# Patient Record
Sex: MALE | Race: WHITE | Employment: FULL TIME | URBAN - METROPOLITAN AREA
[De-identification: names, ages, dates, MRNs, and addresses within clinical notes are randomized per-mention and may not be internally consistent; named-entity substitution may affect disease eponyms.]

---

## 2018-10-19 RX ORDER — CEFAZOLIN SODIUM/WATER 2 G/20 ML
2 SYRINGE (ML) INTRAVENOUS ONCE
Status: CANCELLED | OUTPATIENT
Start: 2018-10-19 | End: 2018-10-19

## 2018-10-19 RX ORDER — SODIUM CHLORIDE 0.9 % (FLUSH) 0.9 %
5-10 SYRINGE (ML) INJECTION EVERY 8 HOURS
Status: CANCELLED | OUTPATIENT
Start: 2018-10-19

## 2018-10-19 RX ORDER — SODIUM CHLORIDE 0.9 % (FLUSH) 0.9 %
5-10 SYRINGE (ML) INJECTION AS NEEDED
Status: CANCELLED | OUTPATIENT
Start: 2018-10-19

## 2018-11-04 PROBLEM — M77.11 LATERAL EPICONDYLITIS OF RIGHT ELBOW: Status: ACTIVE | Noted: 2018-11-04

## 2018-11-05 ENCOUNTER — HOSPITAL ENCOUNTER (OUTPATIENT)
Dept: SURGERY | Age: 43
Discharge: HOME | End: 2018-11-05

## 2018-11-11 NOTE — H&P (VIEW-ONLY)
aDate: 2018 Name: Connie Choe MRN: [id number] : 1975 Age: 37 y.o. Sex: male Mono Galan MD  
 
 
CC:  No chief complaint on file. HPI: 
 
 Connie Choe is a 37 y.o. male who presents for evaluation of a soft tissue mass as a referral from Dr. Niranjan Yoon. The patient had a soft tissue mass excised at a dermatologists office, but the wound has dehisced. The lesion on the right upper back is open and draining. It will need reexcision. PMH: 
 
Past Medical History:  
Diagnosis Date  Acute bursitis of right shoulder  Adverse effect of anesthesia   
 hx after wisdom teeth extraction surgery  Androgen deficiency   
 managed with medication  Bigeminy 3/20/14  
 pt reports arrhythmia as result rxn to levaquin; (stress echo): \"baseline EKG shows sinus rhythm with PVCs. \"  
 Heartburn   
 occasionally. diet controlled  High cholesterol  Hx of echocardiogram 3/20/2014  
 exercise stress ECHO- \"non-diagnostic EKG changes with no symptoms of myocardial ischemia. The pt had excellent exercise tolerance. \"  
 Hypertension   
 controlled w/med  Migraine   
 hx. meds prn  Primary osteoarthritis of right elbow  Psychiatric disorder   
 anxiety  Thyroid disease   
 hypothryoid. stable w/med PSH: 
 
Past Surgical History:  
Procedure Laterality Date  HX SHOULDER ARTHROSCOPY Right 2015  HX SHOULDER ARTHROSCOPY Left 2015  HX VASECTOMY [facility] MEDS: 
 
Current Outpatient Medications Medication Sig  levothyroxine (SYNTHROID) 175 mcg tablet Take 175 mcg by mouth Daily (before breakfast). Take / use AM day of surgery  per anesthesia protocols.  fenofibrate nanocrystallized (TRICOR) 145 mg tablet Take  by mouth daily.  lisinopril (PRINIVIL, ZESTRIL) 10 mg tablet Take  by mouth daily.  busPIRone (BUSPAR) 7.5 mg tablet Take 7.5 mg by mouth daily.  Take / use AM day of surgery  per anesthesia protocols.  testosterone (ANDROGEL) 1.25 gram/ actuation (1 %) glpm 5 Pump(s) by TransDERmal route daily.  ergocalciferol (VITAMIN D2) 50,000 unit capsule Take 50,000 Units by mouth every seven (7) days.  fluticasone (FLONASE) 50 mcg/actuation nasal spray as needed.  SUMAtriptan (IMITREX) 50 mg tablet Take 50 mg by mouth once as needed for Migraine. Take / use AM day of surgery if needed per anesthesia protocols.  azelastine (ASTELIN) 137 mcg (0.1 %) nasal spray 1 Spray as needed for Rhinitis. Use in each nostril as directed No current facility-administered medications for this visit. ALLERGIES:   
 
Allergies Allergen Reactions  Levaquin [Levofloxacin] Anaphylaxis  Sulfa (Sulfonamide Antibiotics) Anaphylaxis SH: Social History Tobacco Use  Smoking status: Never Smoker  Smokeless tobacco: Never Used Substance Use Topics  Alcohol use: Yes Comment: on occasion  Drug use: No  
 
 
FH: 
 
Family History Problem Relation Age of Onset  Hypertension Mother  Diabetes Father ROS: The patient has no difficulty with chest pain or shortness of breath. No fever or chills. Comprehensive 13 point review of systems was otherwise unremarkable except as noted above. Physical Exam:  
 
There were no vitals taken for this visit. General: Alert, oriented, cooperative white male in no acute distress. Eyes: Sclera are clear. Conjunctiva and lids within normal limits. No icterus. Ears and Nose: no gross deformities to visual inspection, gross hearing intact Neck: Supple, trachea midline. No lymphadenopathy. Resp: Breathing is  non-labored. Lungs clear to auscultation without wheezing or rhonchi CV: RRR. No murmurs, rubs or gallops appreciated. Abd: soft non-tender and non-distended without peritoneal signs. +bs Back: right upper back previous surgical site open with fibrinous exudate and serous drainage present. Psych:  Mood and affect appropriate. Short-term memory and understanding intact Assessment/Plan:  Nory Navarro is a 37 y.o. male who has signs and symptoms consistent with an open right upper back wound which requires reexcision. 1.  Excision of a right upper back soft tissue mass in the operating room. 2.  I went through the risks of bleeding, infection, anesthesia, hematoma/seroma formation and possible recurrence of the lesion. I said it may be necessary to place a drain. It may be necessary to leave the wound open, if badly infected.  
 
Brandee Mendez MD 
  
 FACS   11/11/2018  7:29 AM

## 2018-11-15 ENCOUNTER — ANESTHESIA EVENT (OUTPATIENT)
Dept: SURGERY | Age: 43
End: 2018-11-15
Payer: COMMERCIAL

## 2018-11-16 ENCOUNTER — ANESTHESIA (OUTPATIENT)
Dept: SURGERY | Age: 43
End: 2018-11-16
Payer: COMMERCIAL

## 2018-11-16 ENCOUNTER — HOSPITAL ENCOUNTER (OUTPATIENT)
Age: 43
Setting detail: OUTPATIENT SURGERY
Discharge: HOME | End: 2018-11-16
Attending: SURGERY | Admitting: SURGERY
Payer: COMMERCIAL

## 2018-11-16 VITALS
WEIGHT: 191.8 LBS | OXYGEN SATURATION: 97 % | HEART RATE: 50 BPM | TEMPERATURE: 97.5 F | DIASTOLIC BLOOD PRESSURE: 78 MMHG | HEIGHT: 68 IN | RESPIRATION RATE: 16 BRPM | SYSTOLIC BLOOD PRESSURE: 131 MMHG | BODY MASS INDEX: 29.07 KG/M2

## 2018-11-16 PROCEDURE — 74011250636 HC RX REV CODE- 250/636

## 2018-11-16 PROCEDURE — 76210000006 HC OR PH I REC 0.5 TO 1 HR: Performed by: SURGERY

## 2018-11-16 PROCEDURE — 76210000020 HC REC RM PH II FIRST 0.5 HR: Performed by: SURGERY

## 2018-11-16 PROCEDURE — 77030018836 HC SOL IRR NACL ICUM -A: Performed by: SURGERY

## 2018-11-16 PROCEDURE — 76010000160 HC OR TIME 0.5 TO 1 HR INTENSV-TIER 1: Performed by: SURGERY

## 2018-11-16 PROCEDURE — 77030039425 HC BLD LARYNG TRULITE DISP TELE -A: Performed by: ANESTHESIOLOGY

## 2018-11-16 PROCEDURE — 74011250636 HC RX REV CODE- 250/636: Performed by: SURGERY

## 2018-11-16 PROCEDURE — 76060000032 HC ANESTHESIA 0.5 TO 1 HR: Performed by: SURGERY

## 2018-11-16 PROCEDURE — 77030032490 HC SLV COMPR SCD KNE COVD -B: Performed by: SURGERY

## 2018-11-16 PROCEDURE — 77030020782 HC GWN BAIR PAWS FLX 3M -B: Performed by: ANESTHESIOLOGY

## 2018-11-16 PROCEDURE — 74011250636 HC RX REV CODE- 250/636: Performed by: ANESTHESIOLOGY

## 2018-11-16 PROCEDURE — 74011000250 HC RX REV CODE- 250: Performed by: SURGERY

## 2018-11-16 PROCEDURE — 74011000250 HC RX REV CODE- 250

## 2018-11-16 PROCEDURE — 77030031139 HC SUT VCRL2 J&J -A: Performed by: SURGERY

## 2018-11-16 PROCEDURE — 88305 TISSUE EXAM BY PATHOLOGIST: CPT

## 2018-11-16 PROCEDURE — 77030037088 HC TUBE ENDOTRACH ORAL NSL COVD-A: Performed by: ANESTHESIOLOGY

## 2018-11-16 RX ORDER — CEFAZOLIN SODIUM/WATER 2 G/20 ML
2 SYRINGE (ML) INTRAVENOUS
Status: COMPLETED | OUTPATIENT
Start: 2018-11-16 | End: 2018-11-16

## 2018-11-16 RX ORDER — ROCURONIUM BROMIDE 10 MG/ML
INJECTION, SOLUTION INTRAVENOUS AS NEEDED
Status: DISCONTINUED | OUTPATIENT
Start: 2018-11-16 | End: 2018-11-16 | Stop reason: HOSPADM

## 2018-11-16 RX ORDER — SODIUM CHLORIDE 0.9 % (FLUSH) 0.9 %
5-10 SYRINGE (ML) INJECTION EVERY 8 HOURS
Status: DISCONTINUED | OUTPATIENT
Start: 2018-11-16 | End: 2018-11-16 | Stop reason: HOSPADM

## 2018-11-16 RX ORDER — SODIUM CHLORIDE 0.9 % (FLUSH) 0.9 %
5-10 SYRINGE (ML) INJECTION AS NEEDED
Status: DISCONTINUED | OUTPATIENT
Start: 2018-11-16 | End: 2018-11-16 | Stop reason: HOSPADM

## 2018-11-16 RX ORDER — DEXAMETHASONE SODIUM PHOSPHATE 4 MG/ML
INJECTION, SOLUTION INTRA-ARTICULAR; INTRALESIONAL; INTRAMUSCULAR; INTRAVENOUS; SOFT TISSUE AS NEEDED
Status: DISCONTINUED | OUTPATIENT
Start: 2018-11-16 | End: 2018-11-16 | Stop reason: HOSPADM

## 2018-11-16 RX ORDER — SUCCINYLCHOLINE CHLORIDE 20 MG/ML
INJECTION INTRAMUSCULAR; INTRAVENOUS AS NEEDED
Status: DISCONTINUED | OUTPATIENT
Start: 2018-11-16 | End: 2018-11-16 | Stop reason: HOSPADM

## 2018-11-16 RX ORDER — FLUMAZENIL 0.1 MG/ML
0.2 INJECTION INTRAVENOUS
Status: DISCONTINUED | OUTPATIENT
Start: 2018-11-16 | End: 2018-11-16 | Stop reason: HOSPADM

## 2018-11-16 RX ORDER — GLYCOPYRROLATE 0.2 MG/ML
INJECTION INTRAMUSCULAR; INTRAVENOUS AS NEEDED
Status: DISCONTINUED | OUTPATIENT
Start: 2018-11-16 | End: 2018-11-16 | Stop reason: HOSPADM

## 2018-11-16 RX ORDER — OXYCODONE HYDROCHLORIDE 5 MG/1
5 TABLET ORAL
Status: DISCONTINUED | OUTPATIENT
Start: 2018-11-16 | End: 2018-11-16 | Stop reason: HOSPADM

## 2018-11-16 RX ORDER — ONDANSETRON 2 MG/ML
INJECTION INTRAMUSCULAR; INTRAVENOUS AS NEEDED
Status: DISCONTINUED | OUTPATIENT
Start: 2018-11-16 | End: 2018-11-16 | Stop reason: HOSPADM

## 2018-11-16 RX ORDER — SODIUM CHLORIDE, SODIUM LACTATE, POTASSIUM CHLORIDE, CALCIUM CHLORIDE 600; 310; 30; 20 MG/100ML; MG/100ML; MG/100ML; MG/100ML
75 INJECTION, SOLUTION INTRAVENOUS CONTINUOUS
Status: DISCONTINUED | OUTPATIENT
Start: 2018-11-16 | End: 2018-11-16 | Stop reason: HOSPADM

## 2018-11-16 RX ORDER — MIDAZOLAM HYDROCHLORIDE 1 MG/ML
2 INJECTION, SOLUTION INTRAMUSCULAR; INTRAVENOUS
Status: DISCONTINUED | OUTPATIENT
Start: 2018-11-16 | End: 2018-11-16 | Stop reason: HOSPADM

## 2018-11-16 RX ORDER — KETOROLAC TROMETHAMINE 30 MG/ML
INJECTION, SOLUTION INTRAMUSCULAR; INTRAVENOUS AS NEEDED
Status: DISCONTINUED | OUTPATIENT
Start: 2018-11-16 | End: 2018-11-16 | Stop reason: HOSPADM

## 2018-11-16 RX ORDER — FENTANYL CITRATE 50 UG/ML
INJECTION, SOLUTION INTRAMUSCULAR; INTRAVENOUS AS NEEDED
Status: DISCONTINUED | OUTPATIENT
Start: 2018-11-16 | End: 2018-11-16 | Stop reason: HOSPADM

## 2018-11-16 RX ORDER — NEOSTIGMINE METHYLSULFATE 1 MG/ML
INJECTION, SOLUTION INTRAVENOUS AS NEEDED
Status: DISCONTINUED | OUTPATIENT
Start: 2018-11-16 | End: 2018-11-16 | Stop reason: HOSPADM

## 2018-11-16 RX ORDER — BUPIVACAINE HYDROCHLORIDE 5 MG/ML
INJECTION, SOLUTION EPIDURAL; INTRACAUDAL AS NEEDED
Status: DISCONTINUED | OUTPATIENT
Start: 2018-11-16 | End: 2018-11-16 | Stop reason: HOSPADM

## 2018-11-16 RX ORDER — HYDROMORPHONE HYDROCHLORIDE 2 MG/ML
0.5 INJECTION, SOLUTION INTRAMUSCULAR; INTRAVENOUS; SUBCUTANEOUS
Status: DISCONTINUED | OUTPATIENT
Start: 2018-11-16 | End: 2018-11-16 | Stop reason: HOSPADM

## 2018-11-16 RX ORDER — LIDOCAINE HYDROCHLORIDE 10 MG/ML
0.1 INJECTION INFILTRATION; PERINEURAL AS NEEDED
Status: DISCONTINUED | OUTPATIENT
Start: 2018-11-16 | End: 2018-11-16 | Stop reason: HOSPADM

## 2018-11-16 RX ORDER — LIDOCAINE HYDROCHLORIDE 20 MG/ML
INJECTION, SOLUTION EPIDURAL; INFILTRATION; INTRACAUDAL; PERINEURAL AS NEEDED
Status: DISCONTINUED | OUTPATIENT
Start: 2018-11-16 | End: 2018-11-16 | Stop reason: HOSPADM

## 2018-11-16 RX ORDER — NALOXONE HYDROCHLORIDE 0.4 MG/ML
0.1 INJECTION, SOLUTION INTRAMUSCULAR; INTRAVENOUS; SUBCUTANEOUS
Status: DISCONTINUED | OUTPATIENT
Start: 2018-11-16 | End: 2018-11-16 | Stop reason: HOSPADM

## 2018-11-16 RX ORDER — SODIUM CHLORIDE, SODIUM LACTATE, POTASSIUM CHLORIDE, CALCIUM CHLORIDE 600; 310; 30; 20 MG/100ML; MG/100ML; MG/100ML; MG/100ML
100 INJECTION, SOLUTION INTRAVENOUS CONTINUOUS
Status: DISCONTINUED | OUTPATIENT
Start: 2018-11-16 | End: 2018-11-16 | Stop reason: HOSPADM

## 2018-11-16 RX ORDER — PROPOFOL 10 MG/ML
INJECTION, EMULSION INTRAVENOUS AS NEEDED
Status: DISCONTINUED | OUTPATIENT
Start: 2018-11-16 | End: 2018-11-16 | Stop reason: HOSPADM

## 2018-11-16 RX ORDER — DIPHENHYDRAMINE HYDROCHLORIDE 50 MG/ML
12.5 INJECTION, SOLUTION INTRAMUSCULAR; INTRAVENOUS
Status: DISCONTINUED | OUTPATIENT
Start: 2018-11-16 | End: 2018-11-16 | Stop reason: HOSPADM

## 2018-11-16 RX ADMIN — PROPOFOL 100 MG: 10 INJECTION, EMULSION INTRAVENOUS at 14:10

## 2018-11-16 RX ADMIN — LIDOCAINE HYDROCHLORIDE 60 MG: 20 INJECTION, SOLUTION EPIDURAL; INFILTRATION; INTRACAUDAL; PERINEURAL at 14:06

## 2018-11-16 RX ADMIN — PROPOFOL 200 MG: 10 INJECTION, EMULSION INTRAVENOUS at 14:06

## 2018-11-16 RX ADMIN — Medication 2 G: at 14:01

## 2018-11-16 RX ADMIN — SODIUM CHLORIDE, SODIUM LACTATE, POTASSIUM CHLORIDE, AND CALCIUM CHLORIDE 100 ML/HR: 600; 310; 30; 20 INJECTION, SOLUTION INTRAVENOUS at 12:01

## 2018-11-16 RX ADMIN — KETOROLAC TROMETHAMINE 30 MG: 30 INJECTION, SOLUTION INTRAMUSCULAR; INTRAVENOUS at 14:32

## 2018-11-16 RX ADMIN — ROCURONIUM BROMIDE 10 MG: 10 INJECTION, SOLUTION INTRAVENOUS at 14:06

## 2018-11-16 RX ADMIN — ONDANSETRON 4 MG: 2 INJECTION INTRAMUSCULAR; INTRAVENOUS at 14:23

## 2018-11-16 RX ADMIN — SUCCINYLCHOLINE CHLORIDE 120 MG: 20 INJECTION INTRAMUSCULAR; INTRAVENOUS at 14:06

## 2018-11-16 RX ADMIN — GLYCOPYRROLATE 0.1 MG: 0.2 INJECTION INTRAMUSCULAR; INTRAVENOUS at 14:43

## 2018-11-16 RX ADMIN — FENTANYL CITRATE 100 MCG: 50 INJECTION, SOLUTION INTRAMUSCULAR; INTRAVENOUS at 14:01

## 2018-11-16 RX ADMIN — NEOSTIGMINE METHYLSULFATE 2 MG: 1 INJECTION, SOLUTION INTRAVENOUS at 14:43

## 2018-11-16 RX ADMIN — DEXAMETHASONE SODIUM PHOSPHATE 4 MG: 4 INJECTION, SOLUTION INTRA-ARTICULAR; INTRALESIONAL; INTRAMUSCULAR; INTRAVENOUS; SOFT TISSUE at 14:23

## 2018-11-16 NOTE — INTERVAL H&P NOTE
H&P Update: 
Thais Lal was seen and examined. History and physical has been reviewed. The patient has been examined.  There have been no significant clinical changes since the completion of the originally dated History and Physical. 
 
Signed By: Eleazar Bonner MD   
 November 16, 2018 11:54 AM

## 2018-11-16 NOTE — BRIEF OP NOTE
BRIEF OPERATIVE NOTE Date of Procedure: 11/16/2018 Preoperative Diagnosis: RIGHT UPPER BACK SOFT TISSUE MASS Postoperative Diagnosis: SAME Procedure(s): EXCISION SOFT TISSUE MASS OF RIGHT UPPER BACK MEASURING 6 CM X 3 CM X 2 CM Surgeon(s) and Role: 
   Lyn Ventura MD - Primary Surgical Assistant: None Surgical Staff: 
Circ-1: Cherie Hawkins RN Scrub Tech-1: Gisselle Peña Scrub Tech-2: Han White Event Time In Time Out Incision Start 1425 Incision Close 1433 Anesthesia: General  
Estimated Blood Loss: 5 cc's Specimens:  
ID Type Source Tests Collected by Time Destination 1 : right upper back soft tissue mass Preservative Back  Lashonda Horton MD 11/16/2018 1425 Pathology Findings: See dictated note Complications: None Implants: * No implants in log *

## 2018-11-16 NOTE — INTERVAL H&P NOTE
H&P Update: 
Vijay Huff was seen and examined. History and physical has been reviewed. The patient has been examined.  There have been no significant clinical changes since the completion of the originally dated History and Physical. 
 
Signed By: Kayla Sofia MD   
 November 16, 2018 11:54 AM

## 2018-11-16 NOTE — ANESTHESIA POSTPROCEDURE EVALUATION
Procedure(s): EXCISION SOFT TISSUE MASS OF BACK. Anesthesia Post Evaluation Patient location during evaluation: PACU Patient participation: complete - patient participated Level of consciousness: awake Pain management: adequate Airway patency: patent Anesthetic complications: no 
Cardiovascular status: acceptable Respiratory status: spontaneous ventilation and acceptable Hydration status: acceptable Visit Vitals /81 Pulse (!) 51 Temp 36.2 °C (97.2 °F) Resp 16 Ht 5' 8\" (1.727 m) Wt 87 kg (191 lb 12.8 oz) SpO2 97% BMI 29.16 kg/m²

## 2018-11-16 NOTE — ANESTHESIA PREPROCEDURE EVALUATION
Anesthetic History PONV Review of Systems / Medical History Patient summary reviewed and pertinent labs reviewed Pulmonary Within defined limits Neuro/Psych Headaches (Migraines) and psychiatric history (Anxiety) Cardiovascular Hypertension: well controlled Dysrhythmias : PVC Hyperlipidemia Exercise tolerance: >4 METS 
  
GI/Hepatic/Renal 
  
GERD: well controlled Endo/Other Hypothyroidism: well controlled Arthritis (OA) Other Findings Physical Exam 
 
Airway Mallampati: II 
TM Distance: 4 - 6 cm Neck ROM: normal range of motion Mouth opening: Normal 
 
 Cardiovascular Regular rate and rhythm,  S1 and S2 normal,  no murmur, click, rub, or gallop Dental 
No notable dental hx Pulmonary Breath sounds clear to auscultation Abdominal 
 
 
 
 Other Findings Anesthetic Plan ASA: 2 Anesthesia type: general 
 
 
 
 
Induction: Intravenous Anesthetic plan and risks discussed with: Patient

## 2018-11-16 NOTE — DISCHARGE INSTRUCTIONS
1. Low fat diet as tolerated. 2. Showering is allowed, but no tub baths, hot tubs or swimming. 3. Drainage is common from the wounds. Change the dressings as needed. Call our office if the wounds become reddened, tender, feel warm to the touch or pus starts to drain from the wound. 4. Take prescribed pain medication as directed, usually Percocet, Norco, Ultram or Dilaudid. Take over the counter medication for minor pain. 5. Ice may be applied intermittently to the surgical site or sites. 6. Call or office, [phone], if problems arise. 7. Follow up in the office at the assigned time. 8. Resume all medications as taken per surgery, unless specifically instructed not to take certain ones. 9. No lifting more than 25 pounds until told otherwise. 10. Driving is allowed 3 days after surgery as long as you feel comfortable enough to drive and have not taken any prescription pain medication prior to driving. After general anesthesia or intravenous sedation, for 24 hours or while taking prescription Narcotics:  · Limit your activities  · Do not drive and operate hazardous machinery  · Do not make important personal or business decisions  · Do  not drink alcoholic beverages  · If you have not urinated within 8 hours after discharge, please contact your surgeon on call. *  Please give a list of your current medications to your Primary Care Provider. *  Please update this list whenever your medications are discontinued, doses are      changed, or new medications (including over-the-counter products) are added. *  Please carry medication information at all times in case of emergency situations. These are general instructions for a healthy lifestyle:  No smoking/ No tobacco products/ Avoid exposure to second hand smoke  Surgeon General's Warning:  Quitting smoking now greatly reduces serious risk to your health.   Obesity, smoking, and sedentary lifestyle greatly increases your risk for illness  A healthy diet, regular physical exercise & weight monitoring are important for maintaining a healthy lifestyle    You may be retaining fluid if you have a history of heart failure or if you experience any of the following symptoms:  Weight gain of 3 pounds or more overnight or 5 pounds in a week, increased swelling in our hands or feet or shortness of breath while lying flat in bed. Please call your doctor as soon as you notice any of these symptoms; do not wait until your next office visit. Recognize signs and symptoms of STROKE:  F-face looks uneven  A-arms unable to move or move unevenly  S-speech slurred or non-existent  T-time-call 911 as soon as signs and symptoms begin-DO NOT go       Back to bed or wait to see if you get better-TIME IS BRAIN.

## 2018-11-17 NOTE — OP NOTES
[facility] REPORT    Selwyn Ascencio  MR#: [id number]  : 1975  ACCOUNT #: [de-identified]   DATE OF SERVICE: 2018    PREOPERATIVE DIAGNOSIS:  Right upper back soft tissue mass. POSTOPERATIVE DIAGNOSIS:  Right upper back soft tissue mass . PROCEDURE PERFORMED:  Excision of a right upper back soft tissue mass measuring 6 x 3 x 2 cm. SURGEON:  Mary Jo Leger MD    ANESTHESIA:  General endotracheal anesthesia plus 30 mL 0.5% Marcaine plain. ESTIMATED BLOOD LOSS:  5 mL. SPECIMENS REMOVED:  Right upper back soft tissue mass. DRAINS:  None. COMPLICATIONS:  None. IMPLANTS:  None. ASSISTANT:   None. HISTORY:  A 26-year-old male who was referred by Dr. Nate Oro of orthopedics. Dr. Zaynab Pepe had planned to do a right elbow surgery. The patient had had a right upper back soft tissue area incised and then closed by a dermatologist in [facility]. The wound dehisced and Dr. Zaynab Pepe could not do the procedure until the wound closed. He referred him to my office for definitive wound closure. I recommended reexcision of the entire area and a primary closure with larger sutures which will be left in for a longer period of time. This is in his upper back and under a fair amount of tension as he is a muscular man. The patient was agreeable, signed a consent form and was scheduled for today. I saw the patient in the preoperative area with his wife. The area was marked with a marking pen. He was then transported to room #3 [address] [place] [address].  He was intubated on the stretcher, then placed on the operating room table in the prone position. He was given 2 g of Ancef as prophylactic antibiotic coverage. His upper back was prepped and draped in the usual sterile manner. Timeout was done identifying the patient, surgical procedures birthdate of 1975.   When everyone in the room agreed with the timeout, I began the procedure. I made an oval type pattern around an opened area. We excised tissue which was skin and soft tissue down to, but not including the fascia. The lesion measured 6 x 3 x 2 cm. The lesion was excised and sent to pathology for evaluation. Flaps were raised circumferentially. Hemostasis achieved with electrocautery. Wound was irrigated. The skin was then closed with some vertical mattress suture of 2-0 nylon. I injected 30 mL of 0.5% Marcaine around this wound site as local anesthesia at the end of procedure. A dry sterile dressing was applied. The patient should be able to go home later today. I gave his wife a prescription for Percocet for pain control and he will follow up with me in 2 weeks on 11/29/2018.       MD GRACY BrunerA / LN  D: 11/16/2018 14:42     T: 11/17/2018 05:03  JOB #: [id number]

## (undated) DEVICE — SHEET, DRAPE, SPLIT, STERILE: Brand: MEDLINE

## (undated) DEVICE — NEEDLE HYPO 21GA L1.5IN GRN POLYPR HUB S STL THN WALL IV

## (undated) DEVICE — DRAPE,TOP,102X53,STERILE: Brand: MEDLINE

## (undated) DEVICE — KENDALL SCD EXPRESS SLEEVES, KNEE LENGTH, MEDIUM: Brand: KENDALL SCD

## (undated) DEVICE — REM POLYHESIVE ADULT PATIENT RETURN ELECTRODE: Brand: VALLEYLAB

## (undated) DEVICE — 2000CC GUARDIAN II: Brand: GUARDIAN

## (undated) DEVICE — SUTURE VCRL SZ 3-0 L27IN ABSRB UD L26MM SH 1/2 CIR J416H

## (undated) DEVICE — SOLUTION IV 1000ML 0.9% SOD CHL

## (undated) DEVICE — SURGICAL PROCEDURE PACK BASIC ST FRANCIS

## (undated) DEVICE — (D)PREP SKN CHLRAPRP APPL 26ML -- CONVERT TO ITEM 371833